# Patient Record
Sex: FEMALE | Race: WHITE | NOT HISPANIC OR LATINO | URBAN - METROPOLITAN AREA
[De-identification: names, ages, dates, MRNs, and addresses within clinical notes are randomized per-mention and may not be internally consistent; named-entity substitution may affect disease eponyms.]

---

## 2024-01-12 ENCOUNTER — APPOINTMENT (RX ONLY)
Dept: URBAN - METROPOLITAN AREA CLINIC 81 | Facility: CLINIC | Age: 49
Setting detail: DERMATOLOGY
End: 2024-01-12

## 2024-01-12 DIAGNOSIS — L57.0 ACTINIC KERATOSIS: ICD-10-CM

## 2024-01-12 PROBLEM — D48.5 NEOPLASM OF UNCERTAIN BEHAVIOR OF SKIN: Status: ACTIVE | Noted: 2024-01-12

## 2024-01-12 PROCEDURE — 17000 DESTRUCT PREMALG LESION: CPT | Mod: 59

## 2024-01-12 PROCEDURE — ? COUNSELING

## 2024-01-12 PROCEDURE — ? LIQUID NITROGEN

## 2024-01-12 PROCEDURE — ? BIOPSY BY SHAVE METHOD

## 2024-01-12 PROCEDURE — 11102 TANGNTL BX SKIN SINGLE LES: CPT

## 2024-01-12 ASSESSMENT — LOCATION SIMPLE DESCRIPTION DERM: LOCATION SIMPLE: NOSE

## 2024-01-12 ASSESSMENT — LOCATION DETAILED DESCRIPTION DERM
LOCATION DETAILED: NASAL TIP
LOCATION DETAILED: NASAL DORSUM

## 2024-01-12 ASSESSMENT — LOCATION ZONE DERM: LOCATION ZONE: NOSE

## 2024-01-12 NOTE — PROCEDURE: LIQUID NITROGEN
Show Aperture Variable?: Yes
Consent: The patient's consent was obtained including but not limited to risks of crusting, scabbing, blistering, scarring, darker or lighter pigmentary change, recurrence, incomplete removal and infection.
Post-Care Instructions: I reviewed with the patient in detail post-care instructions. Patient is to wear sunprotection, and avoid picking at any of the treated lesions. Pt may apply Vaseline to crusted or scabbing areas.
Number Of Freeze-Thaw Cycles: 2 freeze-thaw cycles
Render Note In Bullet Format When Appropriate: No
Duration Of Freeze Thaw-Cycle (Seconds): 5
Application Tool (Optional): Liquid Nitrogen Sprayer
Detail Level: Zone